# Patient Record
Sex: MALE
[De-identification: names, ages, dates, MRNs, and addresses within clinical notes are randomized per-mention and may not be internally consistent; named-entity substitution may affect disease eponyms.]

---

## 2022-05-13 ENCOUNTER — APPOINTMENT (OUTPATIENT)
Dept: ORTHOPEDIC SURGERY | Facility: CLINIC | Age: 42
End: 2022-05-13
Payer: COMMERCIAL

## 2022-05-13 VITALS — BODY MASS INDEX: 28.14 KG/M2 | WEIGHT: 190 LBS | HEIGHT: 69 IN

## 2022-05-13 DIAGNOSIS — Z78.9 OTHER SPECIFIED HEALTH STATUS: ICD-10-CM

## 2022-05-13 PROBLEM — Z00.00 ENCOUNTER FOR PREVENTIVE HEALTH EXAMINATION: Status: ACTIVE | Noted: 2022-05-13

## 2022-05-13 PROCEDURE — 73140 X-RAY EXAM OF FINGER(S): CPT | Mod: RT

## 2022-05-13 PROCEDURE — 99203 OFFICE O/P NEW LOW 30 MIN: CPT

## 2022-05-14 NOTE — ASSESSMENT
[FreeTextEntry1] : The small finger is monica-taped to the ring finger.  Activities as tolerated with the finger taped.  f/u with Dr Trivedi in 1 week.

## 2022-05-14 NOTE — HISTORY OF PRESENT ILLNESS
[1] : 2 [4] : 4 [de-identified] : 42 YM with injury to his right small finger a little over a month ago.  He reports persisted pain and swelling.  he also reports recent exacerbation. [FreeTextEntry5] : pt injured lt pinky a month ago, and reinjured it a week ago \par

## 2022-05-14 NOTE — PHYSICAL EXAM
[5th Finger] : 5th finger [NL (75)] : Dorsiflexion 75 degrees [NL (85)] : volarflexion 85 degrees [NL (25)] : radial deviation 25 degrees [NL (40)] : ulnar deviation 40 degrees [NL (90)] : supination 90 degrees [5th] : 5th [PIP Joint] : PIP joint [5___] : pinch 5[unfilled]/5 [Right] : right fingers [There are no fractures, subluxations or dislocations. No significant abnormalities are seen] : There are no fractures, subluxations or dislocations. No significant abnormalities are seen [] : no focal motor deficits

## 2022-05-19 ENCOUNTER — APPOINTMENT (OUTPATIENT)
Dept: ORTHOPEDIC SURGERY | Facility: CLINIC | Age: 42
End: 2022-05-19
Payer: COMMERCIAL

## 2022-05-19 VITALS — WEIGHT: 190 LBS | HEIGHT: 69 IN | BODY MASS INDEX: 28.14 KG/M2

## 2022-05-19 DIAGNOSIS — S63.639A SPRAIN OF INTERPHALANGEAL JOINT OF UNSPECIFIED FINGER, INITIAL ENCOUNTER: ICD-10-CM

## 2022-05-19 PROCEDURE — 99213 OFFICE O/P EST LOW 20 MIN: CPT

## 2022-05-19 NOTE — HISTORY OF PRESENT ILLNESS
[Intermittent] : intermittent [Household chores] : household chores [Leisure] : leisure [Work] : work [Full time] : Work status: full time [de-identified] : R SF injury 4 weeks ago\par He is getting better  [] : no [FreeTextEntry1] : Rt Pinky finger [FreeTextEntry3] : about a month ago [FreeTextEntry6] : Continued swelling  [de-identified] : XR @ [facility] [de-identified] : Splinted [de-identified] : R

## 2022-05-19 NOTE — PHYSICAL EXAM
[de-identified] : R SF\par Swelling PIP\par Nontender \par Good ROM\par No instability\par \par Xrays neg

## 2024-07-17 ENCOUNTER — NON-APPOINTMENT (OUTPATIENT)
Age: 44
End: 2024-07-17